# Patient Record
(demographics unavailable — no encounter records)

---

## 2024-10-25 NOTE — PHYSICAL EXAM
[FreeTextEntry1] : There is no lumbar palpation tenderness. There is full range of movement of the lumbar spine. Straight leg raising is negative bilaterally. Armaan's maneuver negative bilaterally.  [General Appearance - Alert] : alert [General Appearance - In No Acute Distress] : in no acute distress [General Appearance - Well-Appearing] : healthy appearing [Oriented To Time, Place, And Person] : oriented to person, place, and time [Impaired Insight] : insight and judgment were intact [Affect] : the affect was normal [Memory Recent] : recent memory was not impaired [Person] : oriented to person [Place] : oriented to place [Time] : oriented to time [Concentration Intact] : normal concentrating ability [Visual Intact] : visual attention was ~T not ~L decreased [Fluency] : fluency intact [Comprehension] : comprehension intact [Past History] : adequate knowledge of personal past history [Cranial Nerves Optic (II)] : visual acuity intact bilaterally,  visual fields full to confrontation, pupils equal round and reactive to light [Cranial Nerves Oculomotor (III)] : extraocular motion intact [Cranial Nerves Trigeminal (V)] : facial sensation intact symmetrically [Cranial Nerves Facial (VII)] : face symmetrical [Cranial Nerves Vestibulocochlear (VIII)] : hearing was intact bilaterally [Cranial Nerves Glossopharyngeal (IX)] : tongue and palate midline [Cranial Nerves Accessory (XI - Cranial And Spinal)] : head turning and shoulder shrug symmetric [Cranial Nerves Hypoglossal (XII)] : there was no tongue deviation with protrusion [Motor Tone] : muscle tone was normal in all four extremities [Motor Strength Upper Extremities Right] : strength was normal in the right upper extremity [Motor Strength Upper Extremities Left] : strength was normal in the left upper extremity [Motor Strength Lower Extremities Right] : there was weakness of the right lower extremity [Motor Strength Lower Extremities Left] : strength was normal in the left lower extremity [Sensation Tactile Decrease] : light touch was intact [Sensation Pain / Temperature Decrease] : pain and temperature was intact [Proprioception] : proprioception was intact [Romberg's Sign] : Romberg's sign was negtive [Balance] : balance was intact [Past-pointing] : there was no past-pointing [Tremor] : no tremor present [Coordination - Dysmetria Impaired Finger-to-Nose Bilateral] : not present [2+] : Brachioradialis left 2+ [0] : Ankle jerk right 0 [1+] : Ankle jerk left 1+ [Plantar Reflex Right Only] : normal on the right [Plantar Reflex Left Only] : normal on the left [FreeTextEntry5] : No tongue fasciculations [FreeTextEntry6] : Significant atrophy of the right leg below the knee involving both the anterior and posterior compartments. Mild weakness of right foot dorsiflexion otherwise strength normal.  Proximal leg strength normal as well. [FreeTextEntry8] : Mild steppage quality on the right due to mild right foot drop. [PERRL With Normal Accommodation] : pupils were equal in size, round, reactive to light, with normal accommodation [Extraocular Movements] : extraocular movements were intact [Full Visual Field] : full visual field

## 2024-10-25 NOTE — HISTORY OF PRESENT ILLNESS
[FreeTextEntry1] : This 46-year-old man was seen in neurological consultation today He has noted atrophy of the distal right leg below the knee going on for about the last 10 years Says this may have gotten a little worse with time but not dramatically so Feels he has a little bit of weakness in the right foot He has no pain either in the leg or back Denies any numbness Denies any injury  Medical history is significant for diabetes, coronary artery disease status post MI, pulmonary sarcoid for which she currently takes no medication  Non-smoker, no alcohol use.

## 2024-10-25 NOTE — ASSESSMENT
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

## 2024-12-11 NOTE — HISTORY OF PRESENT ILLNESS
[FreeTextEntry1] : 47 y/o male, , father of three. 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 PMH DM2, HLD, and sarcoidosis who presents to the office for the first time for hospital discharge f/u. He was in [facility] from 4/22-23/2020 for NSTEMI (chest pressure radiating to the neck and arm). LHC revealed LAD disease (80%) that was treated with 1 KANDACE (SYNERGY 3.0 x 32 mm). Has ostial D1 - 60% stenosis. He was also found to be COVID+ during the hospital stay.  At the present time patient is completely asymptomatic and denies CP, SOB, orthopnea or PND. Denies palpitations, dizziness or ankle swelling. Stopped his ACE inh, BB and aspirin (!). States that makes him drowsy.  TTE 2/2022 revealing aortic root measuring 4.3 cm and ascending aorta - 3.6 cm.  CT of the chest in 4/2022 revealed ascending aorta 4.1 cm.  Sarcoidosis of lungs since 2017, treated mainly with steroids. Not treated at the present time. C/O of effort-induced SOB NYHA 2/4.  DM2 known since 2010, treated with diet and medications. No evidence of end organ damage. KQE2m=8.0% in 3/2022, now 7.2 mg/dL (12/2024) Hyperlipidemia= VH=913 mg/dL, CE=466 mg/dL; HLD=29 mg/dL and IBK=692 mg/dL (12/2024). Treated with meds since the MI Strong family history of CAD (father at age 50). Doesn't smoke Doesn't drink alcohol Denies the use of illicit drugs Has received the COVID 19 vaccine and booster

## 2024-12-11 NOTE — ASSESSMENT
[FreeTextEntry1] : EKG performed today at the office revealed a NSR 72, with normal AQRS, NC, QRS and QTc.

## 2024-12-11 NOTE — DISCUSSION/SUMMARY
[EKG obtained to assist in diagnosis and management of assessed problem(s)] : EKG obtained to assist in diagnosis and management of assessed problem(s) [FreeTextEntry1] : Mr. CARLY FARR is a 46 year male with known CAD. Underwent PCI to the LAD in April 2020. Since then he has been asymptomatic. Has stopped the Ace inh, BB and aspirin on his own. Seems that he also stopped statins and his LDL and TG (>500 mg/dL) are very high. Discussed this with patient diet compliance also.  Patient will RE cataract surgery. This is a low risk patient undergoing a low risk procedure (cardiac risk <1%). Therefore there is no need for further testing prior to the procedure. Will restart the aspirin 81 mg/day Will start rosuvastatin 40 mg/day Reviewed results of blood work and the DUO=182 mg/dL and TG >500 mg/dL.  Discussed the importance of diet and exercise in the management of his diabetes.  Will repeat chest CT revealed to assess ascending aorta (4.1 cm in 2022) Routine follow up in 6 months.

## 2025-04-10 NOTE — PHYSICAL EXAM
[No Acute Distress] : no acute distress [Well Nourished] : well nourished [Well Developed] : well developed [Well-Appearing] : well-appearing [No Lymphadenopathy] : no lymphadenopathy [Supple] : supple [Thyroid Normal, No Nodules] : the thyroid was normal and there were no nodules present [No Respiratory Distress] : no respiratory distress  [Clear to Auscultation] : lungs were clear to auscultation bilaterally [Normal Rate] : normal rate  [Regular Rhythm] : with a regular rhythm [Normal S1, S2] : normal S1 and S2 [No Murmur] : no murmur heard [No Varicosities] : no varicosities [Pedal Pulses Present] : the pedal pulses are present [No Edema] : there was no peripheral edema [Soft] : abdomen soft [Non Tender] : non-tender [Non-distended] : non-distended [Normal Bowel Sounds] : normal bowel sounds [No CVA Tenderness] : no CVA  tenderness [No Spinal Tenderness] : no spinal tenderness [No Joint Swelling] : no joint swelling [Grossly Normal Strength/Tone] : grossly normal strength/tone [No Rash] : no rash [Normal Gait] : normal gait [Normal Affect] : the affect was normal [Normal Mood] : the mood was normal [Normal Insight/Judgement] : insight and judgment were intact [de-identified] : friendly [de-identified] : RLE muscle atrophy below knee

## 2025-04-10 NOTE — HISTORY OF PRESENT ILLNESS
[FreeTextEntry1] : New pt [de-identified] : 46 year old M comes to establish medical care. No recent PCP.  Medical history and medications reviewed with patient. Hx of CAD s/p stent, DM2, and HLD. He is on Glimepiride, Jardiance, Metformin, Trulicity, and Crestor. He feels well today. He has RLE atrophy and known neuropathy. He would like to see another neurologist for a 2nd opinion.

## 2025-04-10 NOTE — PLAN
Results reviewed and within normal limits. No further action required.
[FreeTextEntry1] : DM2 - continue current med regimen. Followed with endocrine. HLD - continue Crestor. CAD s/p stent - followed with cardiologist Dr. Crabtree. Sarcoidosis - referred to pulmonary. RLE neuropathy - referred to neurology for 2nd opinion.  RTO 2 months for physical. Lab slip provided.

## 2025-04-10 NOTE — HEALTH RISK ASSESSMENT
[0] : 2) Feeling down, depressed, or hopeless: Not at all (0) [PHQ-2 Negative - No further assessment needed] : PHQ-2 Negative - No further assessment needed [Former] : Former [10-14] : 10-14 [< 15 Years] : < 15 Years [[id number]] : 0

## 2025-06-27 NOTE — HISTORY OF PRESENT ILLNESS
[FreeTextEntry1] : physical [de-identified] : Patient comes for an annual exam.  He reports feeling well. Has chronic right shoulder pain.  He would like to see derm for skin check. Has lesion on nose.  He was unable to tolerate Crestor so was stopped.  He is otherwise compliant with medications.

## 2025-06-27 NOTE — PLAN
[FreeTextEntry1] : Lab results discussed with patient. Discussed diet and exercise. PSA normal. Cologuard ordered. Vaccines discussed. Flu shot always declined. DM2 - A1c worse now 7.7. Continue current med regimen. Follow up with endocrine. HLD - LDL controlled. Pt unable to tolerate Crestor. Continue Zetia. CAD s/p stent - followed with cardiologist Dr. Crabtree. Sarcoidosis - pulmonary consult scheduled next month. RLE neuropathy - scheduled to see neurology for 2nd opinion. Ortho referral for RUE pain. Dermatology referral for skin check.  RTO 6 months fasting.

## 2025-06-27 NOTE — REVIEW OF SYSTEMS
[Joint Pain] : joint pain [Back Pain] : no back pain [FreeTextEntry9] : right shoulder pain w/ abduction x 7 months [de-identified] : RLE neuropathy

## 2025-06-27 NOTE — PHYSICAL EXAM
[Normal] : the outer ears and nose were normal in appearance and the oropharynx was normal [Alert and Oriented x3] : oriented to person, place, and time [de-identified] : friendly [de-identified] : RLE muscle atrophy below knee; RUE limited ROM w/ abduction [de-identified] : small circular lump on left side of nose, scattered nevi

## 2025-06-27 NOTE — HEALTH RISK ASSESSMENT
[Good] : ~his/her~  mood as  good [No] : In the past 12 months have you used drugs other than those required for medical reasons? No [Little interest or pleasure doing things] : 1) Little interest or pleasure doing things [Feeling down, depressed, or hopeless] : 2) Feeling down, depressed, or hopeless [PHQ-2 Negative - No further assessment needed] : PHQ-2 Negative - No further assessment needed [With Family] : lives with family [Employed] : employed [] :  [# Of Children ___] : has [unfilled] children [Sexually Active] : sexually active [Smoke Detector] : smoke detector [Carbon Monoxide Detector] : carbon monoxide detector [Seat Belt] :  uses seat belt [Sunscreen] : uses sunscreen [AWO0Lvori] : 0 [Change in mental status noted] : No change in mental status noted [High Risk Behavior] : no high risk behavior [Reports changes in hearing] : Reports no changes in hearing [Reports changes in vision] : Reports no changes in vision [Reports changes in dental health] : Reports no changes in dental health [ColonoscopyComments] : never

## 2025-07-16 NOTE — PHYSICAL EXAM
[No Acute Distress] : no acute distress [Normal Oropharynx] : normal oropharynx [Normal Appearance] : normal appearance [No Neck Mass] : no neck mass [Normal Rate/Rhythm] : normal rate/rhythm [Normal S1, S2] : normal s1, s2 [No Murmurs] : no murmurs [No Resp Distress] : no resp distress [No Abnormalities] : no abnormalities [Benign] : benign [Normal Gait] : normal gait [No Clubbing] : no clubbing [No Cyanosis] : no cyanosis [No Edema] : no edema [FROM] : FROM [Normal Color/ Pigmentation] : normal color/ pigmentation [No Focal Deficits] : no focal deficits [Oriented x3] : oriented x3 [Normal Affect] : normal affect [TextBox_68] : decreased BS at the bases

## 2025-07-16 NOTE — PHYSICAL EXAM
[de-identified] : Constitutional: Well-nourished, well-developed, No acute distress Respiratory:  Good respiratory effort, no SOB Psychiatric: Pleasant and normal affect, alert and oriented x3 Skin: Clean dry and intact B/L UE Musculoskeletal: normal except where as noted in regional exam   Right Shoulder: APPEARANCE: no marked deformities, no swelling or malalignment POSITIVE TENDERNESS: supraspinatus NONTENDER:  long head biceps tendon, infraspinatus, teres minor. biceps. anterior and posterior capsule. AC joint. ROM: full with mild painful arc past 60 degrees, no scapular winging or dyskinesia present RESISTIVE TESTING: MMT 4+/5 ER, Flexion and Empty can, 5/5 IR. painless 5/5 resisted ext, horizontal abd/add SPECIAL TESTS: + Zabala and Neers, mildly + cross arm adduction, + Speeds, neg Bryan's, neg Drop Arm, neg Apprehension. neg apley's scratch test

## 2025-07-16 NOTE — HISTORY OF PRESENT ILLNESS
[Current] : current [TextBox_4] : Mr. CARLY FARR is a 46 year old man with  sarcoidosis, CAD, DM, HLD is here for evaluation.   Initial Hx 7/2025 Was dx with sarcoidosis in approx [id number] via bronchoscopy at [place]. Was treated with prednisone for about a year.  Reports SOB and chest heaviness when exposed to sun and heat. He has no cough, no wheezing. His fingers get tight/swallen. He has never been seen by rheumatology. He does kickboxing 4 days/week, gets winded when running.   CTA 2/2025 - he central tracheobronchial tree is patent.  Tiny bilateral lung nodules are redemonstrated. Mildly enlarged mediastinal lymph nodes   ROS: some seasonal allergies.  denies fevers, chills, night sweats, unintentional weight loss  PMH: sarcoidosis, CAD, DM, HLD Meds: per chart All: NKDa SH: currently smokes cigars 1-2 times/week;  before 1/4 ppd x 15 years, quit cigarettes in 2012 FH: father with occupation lung dis (worked as [employer]) PMD: DANNY THOMPSON  [YearQuit] : 2012

## 2025-07-16 NOTE — HISTORY OF PRESENT ILLNESS
[de-identified] : CARLY FARR , 46 year old male presents to office for R shoulder stiffness/tightness for many years. Without inciting injury. Notes  R shoulder tightness and pinching - right shoulder feels heavy  Notes he had thia for yeaes without iciting injrury in the past Denies use of pain medication - denies pain

## 2025-07-16 NOTE — CONSULT LETTER
[Dear  ___] : Dear  [unfilled], [Consult Letter:] : I had the pleasure of evaluating your patient, [unfilled]. [Please see my note below.] : Please see my note below. [Consult Closing:] : Thank you very much for allowing me to participate in the care of this patient.  If you have any questions, please do not hesitate to contact me. [FreeTextEntry3] : Sincerely,  Jazmin Montano MD [facility] tel: [phone] fax: [phone]

## 2025-07-16 NOTE — ASSESSMENT
[FreeTextEntry1] : Mr. CARLY FARR is a 46 year old man with  sarcoidosis, CAD, DM, HLD is here for evaluation.   #Sarcoidosis - dx approx [phone] via bronchoscopy at [place]. Was treated with prednisone for about a year.    CTA 2/2025 - he central tracheobronchial tree is patent.  Tiny bilateral lung nodules are redemonstrated. Mildly enlarged mediastinal lymph nodes PFts with moderate restrictive defect  #SOB -I am not convinced his respiratory symptoms are related to sarcoidosis.  Wonder if there is component of underlying airway disease -- Trial of ICS inhaler, albuterol as needed  #NATHAN -diagnosed with mild NATHAN in the past.  AHI 8.  Asymptomatic.  No need for treatment at this time  All questions answered. Patient in agreement with plan. Follow up in 3mo or call sooner if needed.

## 2025-07-16 NOTE — DISCUSSION/SUMMARY
[de-identified] : Discussed findings of today's exam and possible causes of patient's pain.  Educated patient on their most probable diagnosis of chronic intermittent right shoulder pain with recent atraumatic exacerbation due to subacromial impingement.  Reviewed possible courses of treatment, and we collaboratively decided best course of treatment at this time will include conservative management.  Patient started on a course of oral NSAIDs, prescription given for Naprosyn (We discussed all possible side effects of this medication).  Patient will be started on a course of physical therapy to restore normal range of motion and strength as tolerated.  Patient has no recent injury or trauma, no weakness on clinical exam, no apparent surgical indications, recommend deferral of MRI.  Follow up as needed.  Patient appreciates and agrees with current plan.  This note was generated using dragon medical dictation software.  A reasonable effort has been made for proofreading its contents, but typos may still remain.  If there are any questions or points of clarification needed please notify my office.